# Patient Record
Sex: MALE | Race: WHITE | NOT HISPANIC OR LATINO | Employment: OTHER | URBAN - METROPOLITAN AREA
[De-identification: names, ages, dates, MRNs, and addresses within clinical notes are randomized per-mention and may not be internally consistent; named-entity substitution may affect disease eponyms.]

---

## 2020-10-21 RX ORDER — EZETIMIBE 10 MG/1
10 TABLET ORAL DAILY
COMMUNITY
End: 2020-10-29

## 2020-10-21 RX ORDER — DICLOFENAC SODIUM 75 MG/1
75 TABLET, DELAYED RELEASE ORAL 2 TIMES DAILY
COMMUNITY

## 2020-10-21 RX ORDER — IBUPROFEN 800 MG/1
800 TABLET ORAL EVERY 8 HOURS PRN
COMMUNITY
End: 2020-10-29

## 2020-10-21 RX ORDER — MONTELUKAST SODIUM 10 MG/1
10 TABLET ORAL NIGHTLY
COMMUNITY
End: 2020-10-29

## 2020-10-21 RX ORDER — SILDENAFIL 50 MG/1
50 TABLET, FILM COATED ORAL DAILY PRN
COMMUNITY
End: 2020-10-29

## 2020-10-21 RX ORDER — HYDROCODONE BITARTRATE AND ACETAMINOPHEN 7.5; 325 MG/1; MG/1
1 TABLET ORAL EVERY 6 HOURS PRN
COMMUNITY
End: 2020-10-29

## 2020-10-21 RX ORDER — CETIRIZINE HYDROCHLORIDE 10 MG/1
10 TABLET ORAL DAILY
COMMUNITY
End: 2020-10-29

## 2020-10-21 NOTE — PROGRESS NOTES
Subjective   History of Present Illness: Adam Hurt is a 60 y.o. male is here today as a self referral for numbness and tingling in bilateral hands.  States this week his hands have swelled to where it is hard for him to even close his fist.  He states the numbness and pain in his hands wakes him up at night.  He also reports pain in his right shoulder and arm. He states this has been going on a few months. He doesn't really have neck pain. He has tried oral steroids. He also reports bilateral leg pain with back pain but that has improved with physical therapy and is really not troubling him any longer.     While in the room and during my examination of the patient I wore a mask and eye protection.  I washed my hands before and after this patient encounter.  The patient was also wearing a mask.    Arm Pain   The incident occurred more than 1 week ago. The pain is present in the left hand, right clavicle, right elbow, right fingers, right forearm, right shoulder, right hand and right wrist. The quality of the pain is described as aching. The pain radiates to the left hand and right hand. The pain has been fluctuating since the incident. Associated symptoms include muscle weakness, numbness and tingling. Pertinent negatives include no chest pain. Treatments tried: Oral steroids.       The following portions of the patient's history were reviewed and updated as appropriate: allergies, current medications, past family history, past medical history, past social history, past surgical history and problem list.    Review of Systems   Constitutional: Positive for activity change. Negative for fever.   HENT: Negative.  Negative for congestion.    Eyes: Negative.  Negative for discharge.   Respiratory: Negative.  Negative for chest tightness and shortness of breath.    Cardiovascular: Negative.  Negative for chest pain.   Gastrointestinal: Negative.  Negative for nausea.   Endocrine: Negative.  Negative for cold  "intolerance.   Genitourinary: Negative.  Negative for difficulty urinating.   Musculoskeletal: Negative.  Negative for back pain and neck pain.   Skin: Negative.  Negative for rash.   Allergic/Immunologic: Negative.  Negative for environmental allergies.   Neurological: Positive for tingling and numbness.   Hematological: Negative.  Does not bruise/bleed easily.   Psychiatric/Behavioral: Positive for sleep disturbance.       Objective     Vitals:    10/29/20 0907   BP: 161/95   Pulse: 83   Temp: 97.8 °F (36.6 °C)   Weight: 118 kg (260 lb)   Height: 175.3 cm (69\")     Body mass index is 38.4 kg/m².      Physical Exam  Neurologic Exam    Physical Exam:    CONSTITUTIONAL: This 60 year old right handed  male appears well developed, obese and in no acute distress.    HEAD & FACE: the head and face are symmetric, normocephalic and atraumatic.    EYES: Inspection of the conjunctivae and lids reveals no swelling, erythema or discharge.  Pupils are round, equal and reactive to light and there is no scleral icterus.    EARS, NOSE, MOUTH & THROAT: On inspection, the ears and nose are within normal limits.    NECK: the neck is supple and symmetric. The trachea is midline with no masses.    PULMONARY: Respiratory effort is normal with no increased work of breathing or signs of respiratory distress.    CARDIOVASCULAR: Pedal pulses are +2/4 bilaterally. Examination of the extremities shows no edema or varicosities.    LYMPHATIC: There is no palpable lymphadenopathy of the neck.    MUSCULOSKELETAL: Gait and station are within normal limits. The spine has normal alignment and range of motion.  Tinel's sign is negative at the wrist but he cannot do Phalen sign because he cannot bend his wrist enough because of his hand swelling.  He also has pain upon range of motion especially elevation of his right shoulder.    SKIN: The skin is warm, dry and intact    NEUROLOGIC:   Cranial Nerves 2-12 intact  Normal motor strength noted. " "Muscle bulk and tone are normal.  Sensory exam is normal to fine touch to confrontational testing bilaterally despite the subjective sense of numbness in the median distribution.  Reflexes on the right side demonstrates 1/4 Triceps Reflex, 1/4 Biceps Reflex, 0/4 Brachioradialis Reflex, 1/4 Knee Jerk Reflex, 1/4 Ankle Jerk Reflex and no ankle clonus on the right.   Reflexes on the left side demonstrates 1/4 Triceps Reflex, 1/4 Biceps Reflex, 0/4 Brachioradialis Reflex, 1/4 Knee Jerk Reflex, 1/4 Ankle Jerk Reflex and no ankle clonus on the left.  Superficial/Primitive Reflexes: primitive reflexes were absent.  Romano's, Babinski, and Clonus signs all negative.  No coordination deficit observed.  Radicular testing showed a negative Clyde (SUPRIYA) test and negative straight leg raise.  Cortical function is intact and without deficits. Speech is normal.    PSYCHIATRIC: oriented to person, place and time. Patient's mood and affect are normal.    Assessment/Plan   Independent Review of Radiographic Studies:      I personally reviewed the images from the following studies.    MRI of the cervical spine done at [facility] on 9/23/2020 reveals significant degenerative change throughout the cervical spine without central stenosis but there is measurable neuroforaminal narrowing at C5-C6.    Medical Decision Making:      Although his symptoms are progressed from some right shoulder pain to having some upper arm pain to now bilateral hand numbness and swelling in both hands following physical therapy for his low back clinically my exam and review of the imaging suggest that his symptoms in the hands are related to carpal tunnel syndrome.  My exam also suggest there may be some issue in the right shoulder rather than a radiculopathy.  But the fact that he has low back, neck, both wrists, right shoulder, and left hip suggest a systemic arthritis and he does occasionally take prednisone which makes him feel like a \"new " "man\".  He is going to talk with Dr. Nguyen about trying an alternative anti-inflammatory to prednisone since the indomethacin he was prescribed tends to give him headaches.    For the carpal tunnel syndrome I am going to refer him to an expert hand specialist.    No follow-ups on file.    Diagnoses and all orders for this visit:    1. Bilateral carpal tunnel syndrome (Primary)  -     Ambulatory Referral to Hand Surgery    2. DDD (degenerative disc disease), cervical    3. Acute pain of right shoulder             Roland Kim MD FACS [facility]  Neurological Surgery            "

## 2020-10-29 ENCOUNTER — OFFICE VISIT (OUTPATIENT)
Dept: NEUROSURGERY | Facility: CLINIC | Age: 60
End: 2020-10-29

## 2020-10-29 VITALS
DIASTOLIC BLOOD PRESSURE: 95 MMHG | SYSTOLIC BLOOD PRESSURE: 161 MMHG | BODY MASS INDEX: 38.51 KG/M2 | HEART RATE: 83 BPM | TEMPERATURE: 97.8 F | WEIGHT: 260 LBS | HEIGHT: 69 IN

## 2020-10-29 DIAGNOSIS — M25.511 ACUTE PAIN OF RIGHT SHOULDER: ICD-10-CM

## 2020-10-29 DIAGNOSIS — G56.03 BILATERAL CARPAL TUNNEL SYNDROME: Primary | ICD-10-CM

## 2020-10-29 DIAGNOSIS — M50.30 DDD (DEGENERATIVE DISC DISEASE), CERVICAL: ICD-10-CM

## 2020-10-29 PROCEDURE — 99204 OFFICE O/P NEW MOD 45 MIN: CPT | Performed by: NEUROLOGICAL SURGERY

## 2020-10-29 RX ORDER — PREDNISONE 10 MG/1
10 TABLET ORAL DAILY
COMMUNITY

## 2020-10-29 RX ORDER — INDOMETHACIN 25 MG/1
25 CAPSULE ORAL 3 TIMES DAILY PRN
COMMUNITY

## 2020-11-09 ENCOUNTER — TELEPHONE (OUTPATIENT)
Dept: NEUROSURGERY | Facility: CLINIC | Age: 60
End: 2020-11-09

## 2020-11-09 NOTE — TELEPHONE ENCOUNTER
Leesa dixon/ Dr Jassi Nguyen  [phone]  Fax [phone]   Needs a copy of pt;s last offie note faxed to the office please

## 2020-12-31 ENCOUNTER — HOSPITAL ENCOUNTER (OUTPATIENT)
Dept: CARDIOLOGY | Facility: HOSPITAL | Age: 60
Discharge: HOME | End: 2020-12-31
Admitting: SURGERY

## 2020-12-31 ENCOUNTER — TRANSCRIBE ORDERS (OUTPATIENT)
Dept: ADMINISTRATIVE | Facility: HOSPITAL | Age: 60
End: 2020-12-31

## 2020-12-31 DIAGNOSIS — I10 ESSENTIAL HYPERTENSION, MALIGNANT: Primary | ICD-10-CM

## 2020-12-31 DIAGNOSIS — I10 ESSENTIAL HYPERTENSION, MALIGNANT: ICD-10-CM

## 2020-12-31 LAB — QT INTERVAL: 384 MS

## 2020-12-31 PROCEDURE — 93010 ELECTROCARDIOGRAM REPORT: CPT | Performed by: INTERNAL MEDICINE

## 2020-12-31 PROCEDURE — 93005 ELECTROCARDIOGRAM TRACING: CPT | Performed by: SURGERY
